# Patient Record
Sex: FEMALE | Race: BLACK OR AFRICAN AMERICAN | NOT HISPANIC OR LATINO | URBAN - METROPOLITAN AREA
[De-identification: names, ages, dates, MRNs, and addresses within clinical notes are randomized per-mention and may not be internally consistent; named-entity substitution may affect disease eponyms.]

---

## 2024-12-16 ENCOUNTER — APPOINTMENT (OUTPATIENT)
Dept: OBSTETRICS AND GYNECOLOGY | Facility: CLINIC | Age: 84
End: 2024-12-16
Payer: MEDICARE

## 2024-12-16 VITALS
HEIGHT: 66 IN | SYSTOLIC BLOOD PRESSURE: 172 MMHG | DIASTOLIC BLOOD PRESSURE: 77 MMHG | WEIGHT: 154 LBS | BODY MASS INDEX: 24.75 KG/M2

## 2024-12-16 DIAGNOSIS — Z12.31 ENCOUNTER FOR SCREENING MAMMOGRAM FOR MALIGNANT NEOPLASM OF BREAST: ICD-10-CM

## 2024-12-16 DIAGNOSIS — R10.2 PELVIC PAIN IN FEMALE: ICD-10-CM

## 2024-12-16 DIAGNOSIS — Z01.419 WOMEN'S ANNUAL ROUTINE GYNECOLOGICAL EXAMINATION: Primary | ICD-10-CM

## 2024-12-16 PROCEDURE — 1159F MED LIST DOCD IN RCRD: CPT | Performed by: OBSTETRICS & GYNECOLOGY

## 2024-12-16 PROCEDURE — 1126F AMNT PAIN NOTED NONE PRSNT: CPT | Performed by: OBSTETRICS & GYNECOLOGY

## 2024-12-16 PROCEDURE — 99397 PER PM REEVAL EST PAT 65+ YR: CPT | Performed by: OBSTETRICS & GYNECOLOGY

## 2024-12-16 PROCEDURE — 1160F RVW MEDS BY RX/DR IN RCRD: CPT | Performed by: OBSTETRICS & GYNECOLOGY

## 2024-12-16 PROCEDURE — 1036F TOBACCO NON-USER: CPT | Performed by: OBSTETRICS & GYNECOLOGY

## 2024-12-16 RX ORDER — ROSUVASTATIN CALCIUM 10 MG/1
1 TABLET, COATED ORAL NIGHTLY
COMMUNITY
Start: 2024-11-15 | End: 2025-05-14

## 2024-12-16 RX ORDER — DORZOLAMIDE HYDROCHLORIDE AND TIMOLOL MALEATE 20; 5 MG/ML; MG/ML
1 SOLUTION/ DROPS OPHTHALMIC 2 TIMES DAILY
COMMUNITY
Start: 2023-06-20

## 2024-12-16 RX ORDER — ACETAMINOPHEN 325 MG/1
500 TABLET ORAL
COMMUNITY

## 2024-12-16 RX ORDER — HYDROCORTISONE 25 MG/G
CREAM TOPICAL
COMMUNITY
Start: 2021-12-09

## 2024-12-16 RX ORDER — METOPROLOL SUCCINATE 25 MG/1
25 TABLET, EXTENDED RELEASE ORAL
COMMUNITY
Start: 2024-08-27 | End: 2025-05-14

## 2024-12-16 RX ORDER — TROSPIUM CHLORIDE 20 MG/1
TABLET, FILM COATED ORAL
COMMUNITY
Start: 2024-08-01

## 2024-12-16 RX ORDER — AMLODIPINE BESYLATE 2.5 MG/1
1 TABLET ORAL DAILY
COMMUNITY
Start: 2021-10-02

## 2024-12-16 ASSESSMENT — ENCOUNTER SYMPTOMS
FATIGUE: 1
OCCASIONAL FEELINGS OF UNSTEADINESS: 0
MYALGIAS: 1
LOSS OF SENSATION IN FEET: 0
DEPRESSION: 0
ARTHRALGIAS: 1
JOINT SWELLING: 1
ABDOMINAL PAIN: 1

## 2024-12-16 ASSESSMENT — PAIN SCALES - GENERAL: PAINLEVEL_OUTOF10: 0-NO PAIN

## 2024-12-16 NOTE — PROGRESS NOTES
"Robyn Johnson is a 84 y.o. female who is here for a routine exam. PCP = Berhane Hoffmann MD  Patient here needs annual exam, also referred by primary care physician for pelvic pain.  Patient is not been sexually active for many years.  Status post total abdominal hysterectomy  Patient complaining of lower pelvic pain mostly right-sided which has been going on for several years.  Had the same issue 2 years ago.  Chart reviewed.  Denies nausea vomiting fever chills no GI or  complaints.  Does have some mobility issues.    Review of Systems   Constitutional:  Positive for fatigue.   HENT:  Positive for hearing loss.    Gastrointestinal:  Positive for abdominal pain.   Genitourinary:  Positive for pelvic pain.   Musculoskeletal:  Positive for arthralgias, gait problem, joint swelling and myalgias.   All other systems reviewed and are negative.    OBGyn Exam    Objective   /77   Ht 1.676 m (5' 6\")   Wt 69.9 kg (154 lb)   BMI 24.86 kg/m²   OB History    No obstetric history on file.          GynHx:  Menarche/Menopause: 10/hysterectomy    Social History     Substance and Sexual Activity   Sexual Activity Not on file     STIs: none    Substance:   Tobacco Use: Low Risk  (12/16/2024)    Patient History     Smoking Tobacco Use: Never     Smokeless Tobacco Use: Never     Passive Exposure: Not on file      Social History     Substance and Sexual Activity   Drug Use Never      Social History     Substance and Sexual Activity   Alcohol Use Never     Abuse: No  Depression Screen:   Negative    Past med hx and past surg hx reviewed and notable for: Abdominal hysterectomy    Assessment/Plan      Clinically unremarkable annual GYN exam.  Patient is not been sexually active in many years.  STD testing not done.  Discussed diet exercise calcium vitamin D mammogram ordered.  Persistent right lower quadrant pain patient notes has been there several years of those seems to be a bigger issue now.  Chart reviewed.  Will order " pelvic ultrasound and reevaluate

## 2024-12-17 ENCOUNTER — HOSPITAL ENCOUNTER (OUTPATIENT)
Dept: RADIOLOGY | Facility: CLINIC | Age: 84
Discharge: HOME | End: 2024-12-17
Payer: MEDICARE

## 2024-12-17 DIAGNOSIS — Z12.31 ENCOUNTER FOR SCREENING MAMMOGRAM FOR MALIGNANT NEOPLASM OF BREAST: ICD-10-CM

## 2024-12-17 PROCEDURE — 77067 SCR MAMMO BI INCL CAD: CPT

## 2024-12-20 ENCOUNTER — HOSPITAL ENCOUNTER (OUTPATIENT)
Dept: RADIOLOGY | Facility: HOSPITAL | Age: 84
Discharge: HOME | End: 2024-12-20
Payer: MEDICARE

## 2024-12-20 DIAGNOSIS — R10.2 PELVIC PAIN IN FEMALE: ICD-10-CM

## 2024-12-20 PROCEDURE — 76856 US EXAM PELVIC COMPLETE: CPT

## 2024-12-26 ENCOUNTER — HOSPITAL ENCOUNTER (OUTPATIENT)
Dept: RADIOLOGY | Facility: EXTERNAL LOCATION | Age: 84
Discharge: HOME | End: 2024-12-26

## 2024-12-26 ENCOUNTER — TELEPHONE (OUTPATIENT)
Dept: OBSTETRICS AND GYNECOLOGY | Facility: CLINIC | Age: 84
End: 2024-12-26
Payer: MEDICARE

## 2024-12-26 NOTE — TELEPHONE ENCOUNTER
RN left voicemail to schedule telehealth appt to discuss ultrasound results  Cynthia Maldonado RN

## 2024-12-30 NOTE — TELEPHONE ENCOUNTER
RN left voicemail to schedule telehealth appt to discuss ultrasound results  Cynthia Maldonado RN

## 2025-01-06 NOTE — TELEPHONE ENCOUNTER
Third attempt  RN left voicemail to schedule telehealth appt to discuss ultrasound results  Cynthia Maldonado RN

## 2025-01-07 ENCOUNTER — TELEPHONE (OUTPATIENT)
Dept: OBSTETRICS AND GYNECOLOGY | Facility: CLINIC | Age: 85
End: 2025-01-07
Payer: MEDICARE

## 2025-01-07 NOTE — LETTER
January 7, 2025     Robyn Johnson  [address]  [place] [id number]    Patient: Robyn Johnson   YOB: 1940           This letter is intended for Patient, Robyn Johnson    We have been unable to reach you by phone to schedule a telehealth visit with Dr Balbuena to discuss your recent ultrasound.    Please call to schedule your appointment  [facility]  - [phone]  [facility] [phone]    This will be our last attempt at trying to contact you regarding this appointment    Thank you,    Cynthia Maldonado RN, ADN, BS  On the Behalf of Gus Balbuena MD, OBGYN

## 2025-07-24 ENCOUNTER — TELEPHONE (OUTPATIENT)
Dept: OBSTETRICS AND GYNECOLOGY | Facility: CLINIC | Age: 85
End: 2025-07-24
Payer: MEDICARE

## 2025-07-24 NOTE — TELEPHONE ENCOUNTER
THIS PATIENT WOULD LIKE A CALL BACK PLEASE REGARDING HER TEST RESULTS, SHE MISSED A CALL AND WOULD LIKE A CALL BACK .      THANK YOU

## 2025-07-24 NOTE — TELEPHONE ENCOUNTER
Patient has not been seen by provider recently and has not have lab work or imaging done. Call to patient. She is requesting results from her US done in 12/2024. Reviewed results and recommendations per provider. Patient would like to schedule an appointment with Dr. Balbuena to review results/options. Scheduled for 08/25/2025. No further questions or concerns at this time.

## 2025-07-29 ENCOUNTER — TELEPHONE (OUTPATIENT)
Dept: OBSTETRICS AND GYNECOLOGY | Facility: CLINIC | Age: 85
End: 2025-07-29
Payer: MEDICARE

## 2025-07-29 NOTE — TELEPHONE ENCOUNTER
WE CALLED THIS PATIENT TO INFORM HER THAT DR. ANNE SUGGESTED SHE MAKE A VIRTUAL APPOINTMENT TO DISCUSS ANY LAB RESULTS CONCERNS.    THERE WAS NO AVAILABLE ANSWERING SYSTEM TO LEAVE A MESSAGE.    THANK YOU

## 2025-08-25 ENCOUNTER — APPOINTMENT (OUTPATIENT)
Dept: OBSTETRICS AND GYNECOLOGY | Facility: CLINIC | Age: 85
End: 2025-08-25
Payer: MEDICARE

## 2025-08-25 VITALS
DIASTOLIC BLOOD PRESSURE: 72 MMHG | SYSTOLIC BLOOD PRESSURE: 153 MMHG | HEIGHT: 66 IN | WEIGHT: 156 LBS | BODY MASS INDEX: 25.07 KG/M2

## 2025-08-25 DIAGNOSIS — R10.2 PELVIC PAIN IN FEMALE: Primary | ICD-10-CM

## 2025-08-25 PROCEDURE — 99213 OFFICE O/P EST LOW 20 MIN: CPT | Performed by: OBSTETRICS & GYNECOLOGY

## 2025-08-25 PROCEDURE — 1159F MED LIST DOCD IN RCRD: CPT | Performed by: OBSTETRICS & GYNECOLOGY

## 2025-08-25 PROCEDURE — 1036F TOBACCO NON-USER: CPT | Performed by: OBSTETRICS & GYNECOLOGY

## 2025-08-25 PROCEDURE — 1160F RVW MEDS BY RX/DR IN RCRD: CPT | Performed by: OBSTETRICS & GYNECOLOGY

## 2026-04-13 ENCOUNTER — APPOINTMENT (OUTPATIENT)
Dept: OBSTETRICS AND GYNECOLOGY | Facility: CLINIC | Age: 86
End: 2026-04-13
Payer: MEDICARE